# Patient Record
Sex: MALE | Race: WHITE | Employment: FULL TIME | URBAN - METROPOLITAN AREA
[De-identification: names, ages, dates, MRNs, and addresses within clinical notes are randomized per-mention and may not be internally consistent; named-entity substitution may affect disease eponyms.]

---

## 2018-08-24 ENCOUNTER — HOSPITAL ENCOUNTER (OUTPATIENT)
Dept: CV DIAGNOSTICS | Facility: HOSPITAL | Age: 43
Discharge: HOME | End: 2018-08-24
Attending: INTERNAL MEDICINE
Payer: COMMERCIAL

## 2018-08-24 ENCOUNTER — HOSPITAL ENCOUNTER (OUTPATIENT)
Dept: NUCLEAR MEDICINE | Facility: HOSPITAL | Age: 43
Discharge: HOME | End: 2018-08-24
Attending: INTERNAL MEDICINE
Payer: COMMERCIAL

## 2018-08-24 DIAGNOSIS — R07.9 CHEST PAIN, UNSPECIFIED: ICD-10-CM

## 2018-08-24 PROCEDURE — 78452 HT MUSCLE IMAGE SPECT MULT: CPT | Performed by: INTERNAL MEDICINE

## 2018-08-24 PROCEDURE — 93016 CV STRESS TEST SUPVJ ONLY: CPT

## 2018-08-24 PROCEDURE — 93018 CV STRESS TEST I&R ONLY: CPT

## 2018-08-24 PROCEDURE — 93017 CV STRESS TEST TRACING ONLY: CPT | Performed by: INTERNAL MEDICINE

## 2018-08-24 RX ORDER — SODIUM CHLORIDE 9 MG/ML
INJECTION, SOLUTION INTRAVENOUS
Status: COMPLETED
Start: 2018-08-24 | End: 2018-08-24

## 2018-08-24 RX ORDER — ALPRAZOLAM 0.25 MG/1
0.25 TABLET ORAL NIGHTLY PRN
COMMUNITY

## 2018-08-24 RX ADMIN — SODIUM CHLORIDE: 9 INJECTION, SOLUTION INTRAVENOUS at 11:44:00

## 2019-12-03 PROCEDURE — 88305 TISSUE EXAM BY PATHOLOGIST: CPT | Performed by: INTERNAL MEDICINE

## 2020-01-29 PROBLEM — Z86.010 HISTORY OF ADENOMATOUS POLYP OF COLON: Status: ACTIVE | Noted: 2020-01-29

## 2020-01-29 PROBLEM — Z86.0101 HISTORY OF ADENOMATOUS POLYP OF COLON: Status: ACTIVE | Noted: 2020-01-29

## 2020-04-22 ENCOUNTER — HOSPITAL ENCOUNTER (EMERGENCY)
Facility: HOSPITAL | Age: 45
Discharge: HOME | End: 2020-04-22
Attending: EMERGENCY MEDICINE
Payer: COMMERCIAL

## 2020-04-22 ENCOUNTER — APPOINTMENT (OUTPATIENT)
Dept: GENERAL RADIOLOGY | Facility: HOSPITAL | Age: 45
End: 2020-04-22
Attending: EMERGENCY MEDICINE
Payer: COMMERCIAL

## 2020-04-22 VITALS
OXYGEN SATURATION: 100 % | WEIGHT: 194 LBS | HEIGHT: 71 IN | TEMPERATURE: 99 F | RESPIRATION RATE: 18 BRPM | BODY MASS INDEX: 27.16 KG/M2 | SYSTOLIC BLOOD PRESSURE: 120 MMHG | DIASTOLIC BLOOD PRESSURE: 73 MMHG | HEART RATE: 79 BPM

## 2020-04-22 DIAGNOSIS — J45.21 MILD INTERMITTENT ASTHMA WITH EXACERBATION: ICD-10-CM

## 2020-04-22 DIAGNOSIS — U07.1 COVID-19: Primary | ICD-10-CM

## 2020-04-22 PROCEDURE — 82962 GLUCOSE BLOOD TEST: CPT

## 2020-04-22 PROCEDURE — 99284 EMERGENCY DEPT VISIT MOD MDM: CPT

## 2020-04-22 PROCEDURE — 99453 REM MNTR PHYSIOL PARAM SETUP: CPT

## 2020-04-22 PROCEDURE — 71045 X-RAY EXAM CHEST 1 VIEW: CPT | Performed by: EMERGENCY MEDICINE

## 2020-04-22 PROCEDURE — 81003 URINALYSIS AUTO W/O SCOPE: CPT | Performed by: EMERGENCY MEDICINE

## 2020-04-22 RX ORDER — DEXAMETHASONE SODIUM PHOSPHATE 4 MG/ML
10 INJECTION, SOLUTION INTRA-ARTICULAR; INTRALESIONAL; INTRAMUSCULAR; INTRAVENOUS; SOFT TISSUE ONCE
Status: COMPLETED | OUTPATIENT
Start: 2020-04-22 | End: 2020-04-22

## 2020-04-23 NOTE — ED PROVIDER NOTES
Patient Seen in: [facility] Emergency Department    History   Patient presents with:  Fever    Stated Complaint: Fever w/mult positive contacts d/t field of work     HPI    66-year-old male with past medical history of asthma presenting for evaluati swelling and arthralgias. Positive for stated complaint: Fever w/mult positive contacts d/t field of work  Other systems are as noted in HPI. Constitutional and vital signs reviewed. All other systems reviewed and negative except as noted above. significant pulmonary parenchymal abnormalities. No effusion or pleural thickening. BONES: No fracture or visible bony lesion. OTHER: Negative. CONCLUSION: No acute cardiopulmonary abnormality.    Dictated by (CST): Moncho Ashraf MD on 4/22/2020 at 10:44 PM

## 2020-04-23 NOTE — CM/SW NOTE
Called by Dr. Shawna Johnson to provide patient with IS and pulse oximetry as patient is COVID + and is an asthmatic. Patient instructions included:  1. Orientation to the device, purpose, and return demo given  2.  Instructions reviewed to check with pulse ox d

## 2020-04-23 NOTE — ED NOTES
Pt presents to ED for fevers and congestion. Pt states he has had +covid exposures. Pt denies sob or chest pain.

## 2020-04-23 NOTE — ED NOTES
Discharge instructions reviewed with pt. Pt denies any further questions at this time. Pt understands to return to ED for any sob, low O2 saturations of 90% or lower, or trouble breathing.

## 2020-12-23 PROBLEM — F41.9 ANXIETY: Status: ACTIVE | Noted: 2018-02-20

## 2020-12-23 PROBLEM — F90.2 ATTENTION DEFICIT HYPERACTIVITY DISORDER (ADHD), COMBINED TYPE: Status: ACTIVE | Noted: 2020-12-23

## 2020-12-23 PROBLEM — E78.5 DYSLIPIDEMIA: Status: ACTIVE | Noted: 2017-12-07

## 2020-12-23 PROBLEM — R41.840 COGNITIVE ATTENTION DEFICIT: Status: ACTIVE | Noted: 2020-12-03

## 2021-09-03 PROBLEM — J30.2 SEASONAL ALLERGIC RHINITIS: Status: ACTIVE | Noted: 2021-07-20

## 2023-09-08 ENCOUNTER — LAB REQUISITION (OUTPATIENT)
Dept: LAB | Facility: HOSPITAL | Age: 48
End: 2023-09-08
Payer: COMMERCIAL

## 2023-09-08 DIAGNOSIS — R97.20 ELEVATED PROSTATE SPECIFIC ANTIGEN (PSA): ICD-10-CM

## 2023-09-08 PROCEDURE — 88305 TISSUE EXAM BY PATHOLOGIST: CPT | Performed by: UROLOGY

## 2023-09-19 ENCOUNTER — HOSPITAL ENCOUNTER (OUTPATIENT)
Dept: CT IMAGING | Facility: HOSPITAL | Age: 48
Discharge: HOME | End: 2023-09-19
Attending: UROLOGY
Payer: COMMERCIAL

## 2023-09-19 DIAGNOSIS — C61 PROSTATE CANCER (HCC): ICD-10-CM

## 2023-09-19 LAB
CREAT BLD-MCNC: 0.9 MG/DL
EGFRCR SERPLBLD CKD-EPI 2021: 104 ML/MIN/1.73M2 (ref 60–?)

## 2023-09-19 PROCEDURE — 74177 CT ABD & PELVIS W/CONTRAST: CPT | Performed by: UROLOGY

## 2023-09-19 PROCEDURE — 82565 ASSAY OF CREATININE: CPT

## 2023-09-22 ENCOUNTER — TELEPHONE (OUTPATIENT)
Dept: RADIATION ONCOLOGY | Facility: HOSPITAL | Age: 48
End: 2023-09-22

## 2023-09-22 NOTE — TELEPHONE ENCOUNTER
Appointment Schedule Change  Davy Vail.  1975  Ph:[phone]  Dr Craig Alvarez appointment on 10/5/2023  3:00   Rad on RN @2:30  Was told that their was a 9:00 slot open on this day. would like to know if this is still open to change schedule.    Thanks [employer]

## 2023-10-05 ENCOUNTER — APPOINTMENT (OUTPATIENT)
Dept: RADIATION ONCOLOGY | Facility: HOSPITAL | Age: 48
End: 2023-10-05
Attending: RADIOLOGY
Payer: COMMERCIAL

## 2023-10-09 ENCOUNTER — OFFICE VISIT (OUTPATIENT)
Dept: RADIATION ONCOLOGY | Facility: HOSPITAL | Age: 48
End: 2023-10-09
Attending: RADIOLOGY
Payer: COMMERCIAL

## 2023-10-09 VITALS
TEMPERATURE: 98 F | BODY MASS INDEX: 26 KG/M2 | WEIGHT: 183 LBS | OXYGEN SATURATION: 98 % | SYSTOLIC BLOOD PRESSURE: 130 MMHG | RESPIRATION RATE: 18 BRPM | HEART RATE: 77 BPM | DIASTOLIC BLOOD PRESSURE: 69 MMHG

## 2023-10-09 PROCEDURE — 99212 OFFICE O/P EST SF 10 MIN: CPT

## 2023-10-09 NOTE — PATIENT INSTRUCTIONS
Call [phone] to reach a radiation nurse. Please call with any radiation questions or once a treatment decision has been made.

## 2023-11-20 ENCOUNTER — TELEPHONE (OUTPATIENT)
Dept: RADIATION ONCOLOGY | Facility: HOSPITAL | Age: 48
End: 2023-11-20

## 2023-11-20 NOTE — TELEPHONE ENCOUNTER
Called pt to ask if treatment decision has been made. Pt decided to go forward with surgery, planned next week at [facility]. MD notified.

## 2024-01-30 ENCOUNTER — ORDER TRANSCRIPTION (OUTPATIENT)
Dept: PHYSICAL THERAPY | Facility: HOSPITAL | Age: 49
End: 2024-01-30

## 2024-01-30 DIAGNOSIS — R32 INCONTINENCE: Primary | ICD-10-CM

## 2024-02-13 ENCOUNTER — TELEPHONE (OUTPATIENT)
Dept: PHYSICAL THERAPY | Facility: HOSPITAL | Age: 49
End: 2024-02-13

## 2024-02-14 NOTE — PROGRESS NOTES
PELVIC FLOOR EVALUATION:   Referring Physician: Dr. Pineda  Diagnosis: Incontinence (R32)       Date of onset: 11/28/23 Date of Service: 2/15/2024   Precautions:  Cancer    PATIENT SUMMARY   Victoriano Bhatti is a 48 year old male  who presents to therapy today with complaints of reports that he still has issues with leaking when he drinks alcohol /caffeine. Pt states that he has leaking when he passes gas. He has some variations with urine flow and pubic pain when holding off voiding.Holding off voiding can cause sharp pain, pain is on and off    Current symptoms include: post void dribble and stress incontinence, urine flow, could be frequent ,pubic pain   Current functional limitations/compensations with ADLS/lifestyle : states that he has been wearing pads for precaution, outside the house, especially when he goes out  Pt describes pain level: current 0/10, best 0/10, worst 8/10.   Quality: intermittent and sharp    Prostate History/History of condition:pt states that he has been diagnosed with prostate cancer sept 11 and surgical removal done November the same year, no radiation done and no formal PT    Surgical history:prostatectomy  Urodynamic Test: none  Occupation/Activities: retired policemen, private /driving    Victoriano describes prior level of function pt states that he has was fine,  noticed some dribbling prior. Pt states that he does jujitsu and weight training as an exercise    Pt goals include to not have pads anymore       Past medical history was reviewed with Victoriano. Significant findings include   Past Medical History:   Diagnosis Date    Hyperlipidemia     Lipid screening 06/01/2009    per NG    Prostate cancer (HCC)     Recurrent corneal erosion 2008        Safety Questions:   Are you being hurt, frightened, demeaned, or taken advantage of by anyone at your home or in your life?  No      Have you recently had thoughts of hurting yourself?  No    Have you tried to hurt yourself in the  past?  No       URINARY HABIT  Types of symptoms: stress incontinence, incomplete emptying, and nocturia,pubic pain   Events associated with the onset of urinary complaints: passes gas  Urinary Frequency: could be increased  Urine Stream: varies  Sit/stand for urination:stand  Amount: good  Leaking occurs:   Amount of leakage: small amount  Pad use: as needed  Pad Change frequency: 1  Nocturia: 1x  Fluid Intake: water  Bladder irritants: coffee 1-2 x a day  Urine Stop test: yes  Post void dribble: yes  Empty bladder just in case: yes  Do you ever leak urine without knowing it? No  underwear 1-2 x a day  BOWEL HABITS  Types of symptoms: other      Frequency of bowel movements: 1x aday  Stool consistency: [place] Stool Scale: 3 and 4  Do you strain with defecation: No   Laxative/Stool softener use: No    SEXUAL HEALTH STATUS  Sexual [place] Status: NA  Pain:none  Erection status: on medications, better    ASSESSMENT  Victoriano presents to physical therapy evaluation with primary c/o urgency/frequency, pressure, post void dribble, and stress incontinence , nocturia 1x , after  prostatectomy in November. The results of the objective tests and measures show issues with   motor function, muscle performance, muscle endurance, and coordination involving the PFM with  noted decreased strength, endurance and coordination.  Functional deficits include but are not limited to wearing pads for precaution, outside the house, especially when he goes out.  . Pt and PT discussed evaluation findings, pathology, POC and HEP.  Pt voiced understanding and performs HEP correctly without reported pain. Skilled Pelvic Physical Therapy is medically necessary to address the above impairments and reach functional goals.    OBJECTIVE:   Posture:  Gait:Gait:  pt ambulates on level ground with normal mechanics  Stairs:NT     Range Of Motion  Lumbar AROM screen: WFL in all planes , painfree    Palpation:     Adductors :WNL   Greater  trochanter/Gluteal area :WNL    Piriformis :WNL   Paravertebrals: WNL   LE AROM screen: grossly WNL     Strength (MMT) 5/5 JOHN LE except 5    Transverse Abdominis: NT/5    Flexibility Summary: WNL JOHN LE except min loss on B hamstring  Functional screen:  Double leg squat:WFL  Single leg squat:WFL  Single leg stance: 30 secs    External Pelvic Floor Muscle strength: marked contraction with 5 sec hold  Accessory Muscle Use: adductors    ICIQ-UI Short form:to be determined  Today's Treatment and Response:   Patient education was provided on objective findings expectations with treatment outcomes. Educated on pelvic anatomy and function with diagrams and pelvic model, bladder normatives, adequate hydration levels, and coordination of diaphragmatic breathing and pelvic floor contraction   Diaphragmatic breathing x3  2. Diaphragmatic breathing with PF coordination x3  3. PFM contraction s 5 secs x5 supine  4. PFM quick contraction 1-2 secs x 10 reps  Patient was instructed in and issued a HEP for: 1-4 plus bladder diary    Charges: PT Eval Low Complexity, neuro tierra x 1 ( 10 mins)      Total Timed Treatment: 10 min     Total Treatment Time: 50 min     Based on clinical rationale and outcome measures, this evaluation involved Low Complexity decision making due to 1-2 personal factors/comorbidities, 3 body structures involved/activity limitations, and evolving symptoms including stress urinary incontinence,urine faustino variations and pubic pain  PLAN OF CARE:    Goals: (to be met in 8 visits)    Patient to report urinary frequency to every 2-4 hours to allow for independent ADLs  Patient exhibits an increase in pelvic floor strength with ability to contract and relax and hold secs hold to 10 counts hold with good contraction to allow for pelvic brace with ADLs.    Patient reports a reduction in CAROLEE by 75% or better resulting in decrease pad use from 1 as needed to 0 consistently,decreased underwear change to 1x a day    Pt  will demonstrate biomechanical strategies for managing IAP for functional transfers, floor to waist lifting to  safely perform daily and work tasks with no leakage 75% of the time for safe return to PLOF    Pt will report decreased nocturia incidents  to 0-1 x a night to promote better sleeping pattern    Frequency / Duration: Patient will be seen for 1 x/week or a total of 8 visits over a 90 day period.  Treatment will include: Manual Therapy, Mechanical Traction, Neuromuscular Re-education, Self-Care Home Management, Therapeutic Activities, Therapeutic Exercise, and Home Exercise Program instruction     Education or treatment limitation: None  Rehab Potential:good      Patient/Family/Caregiver was advised of these findings, precautions, and treatment options and has agreed to actively participate in planning and for this course of care.    Thank you for your referral. Please co-sign or sign and return this letter via fax as soon as possible to [phone]. If you have any questions, please contact me at Dept: [phone]    Sincerely,  Electronically signed by therapist: Tabitha Ulloa, PT  Physician's certification required: Yes  I certify the need for these services furnished under this plan of treatment and while under my care.    X___________________________________________________ Date____________________    Certification From: 2/15/2024  To:5/15/2024

## 2024-02-15 ENCOUNTER — OFFICE VISIT (OUTPATIENT)
Dept: PHYSICAL THERAPY | Age: 49
End: 2024-02-15
Attending: UROLOGY
Payer: COMMERCIAL

## 2024-02-15 DIAGNOSIS — R32 INCONTINENCE: Primary | ICD-10-CM

## 2024-02-15 PROCEDURE — 97161 PT EVAL LOW COMPLEX 20 MIN: CPT

## 2024-02-15 PROCEDURE — 97112 NEUROMUSCULAR REEDUCATION: CPT

## 2024-02-20 NOTE — PROGRESS NOTES
Dx:  Incontinence (R32)             Insurance   PPO           Authorized  # visits by insurance  :  8    Expiration date  of Authorization:5/15/24    Eval date/latest PN:2/15/24  Initial POC# of visits: 8                POC cert : 5/15/24    Authorizing Physician: Dr. Pineda    Fall Risk: standard         Precautions: cancer         Subjective: ***  PAIN LEVEL:***/10  Assessment: ***      Objective: ***        Goals:   goals addressed this day as noted above  Patient to report urinary frequency to every 2-4 hours to allow for independent ADLs  Patient exhibits an increase in pelvic floor strength with ability to contract and relax and hold secs hold to 10 counts hold with good contraction to allow for pelvic brace with ADLs.     Patient reports a reduction in CAROLEE by 75% or better resulting in decrease pad use from 1 as needed to 0 consistently,decreased underwear change to 1x a day     Pt will demonstrate biomechanical strategies for managing IAP for functional transfers, floor to waist lifting to  safely perform daily and work tasks with no leakage 75% of the time for safe return to PLOF     Pt will report decreased nocturia incidents  to 0-1 x a night to promote better sleeping pattern     Plan: ***     Date:  TX#: 2/*** Date:              TX#: 3/ Date:        TX#: 4/ Date:               TX#: 5/   Date:   Tx#: 6/   Theraex:        Manual:        Gait/NMRed:        Modalities        HEP see patient instructions tab if with new HEP see patient instructions tab if with new HEP see patient instructions tab if with new HEP see patient instructions tab if with new HEP see patient instructions tab if with new HEP          Charges: ***       Total Timed Treatment: *** min  Total Treatment Time: *** min

## 2024-02-21 ENCOUNTER — TELEPHONE (OUTPATIENT)
Dept: PHYSICAL THERAPY | Facility: HOSPITAL | Age: 49
End: 2024-02-21

## 2024-02-21 ENCOUNTER — APPOINTMENT (OUTPATIENT)
Dept: PHYSICAL THERAPY | Age: 49
End: 2024-02-21
Attending: UROLOGY
Payer: COMMERCIAL

## 2024-02-27 ENCOUNTER — TELEPHONE (OUTPATIENT)
Dept: PHYSICAL THERAPY | Facility: HOSPITAL | Age: 49
End: 2024-02-27

## 2024-02-28 ENCOUNTER — APPOINTMENT (OUTPATIENT)
Dept: PHYSICAL THERAPY | Age: 49
End: 2024-02-28
Attending: UROLOGY
Payer: COMMERCIAL

## 2024-03-06 ENCOUNTER — APPOINTMENT (OUTPATIENT)
Dept: PHYSICAL THERAPY | Age: 49
End: 2024-03-06
Attending: UROLOGY
Payer: COMMERCIAL

## 2024-03-13 ENCOUNTER — APPOINTMENT (OUTPATIENT)
Dept: PHYSICAL THERAPY | Age: 49
End: 2024-03-13
Attending: UROLOGY
Payer: COMMERCIAL

## 2024-03-20 ENCOUNTER — APPOINTMENT (OUTPATIENT)
Dept: PHYSICAL THERAPY | Age: 49
End: 2024-03-20
Attending: UROLOGY
Payer: COMMERCIAL

## 2024-03-27 ENCOUNTER — APPOINTMENT (OUTPATIENT)
Dept: PHYSICAL THERAPY | Age: 49
End: 2024-03-27
Attending: UROLOGY
Payer: COMMERCIAL

## 2024-04-03 ENCOUNTER — APPOINTMENT (OUTPATIENT)
Dept: PHYSICAL THERAPY | Age: 49
End: 2024-04-03
Attending: UROLOGY
Payer: COMMERCIAL

## 2024-04-10 ENCOUNTER — APPOINTMENT (OUTPATIENT)
Dept: PHYSICAL THERAPY | Age: 49
End: 2024-04-10
Attending: UROLOGY
Payer: COMMERCIAL

## 2024-04-17 ENCOUNTER — APPOINTMENT (OUTPATIENT)
Dept: PHYSICAL THERAPY | Age: 49
End: 2024-04-17
Attending: UROLOGY
Payer: COMMERCIAL

## 2024-04-24 ENCOUNTER — APPOINTMENT (OUTPATIENT)
Dept: PHYSICAL THERAPY | Age: 49
End: 2024-04-24
Attending: UROLOGY
Payer: COMMERCIAL

## 2024-06-03 ENCOUNTER — APPOINTMENT (OUTPATIENT)
Dept: URBAN - METROPOLITAN AREA CLINIC 244 | Age: 49
Setting detail: DERMATOLOGY
End: 2024-06-04

## 2024-06-03 DIAGNOSIS — A63.0 ANOGENITAL (VENEREAL) WARTS: ICD-10-CM

## 2024-06-03 PROCEDURE — OTHER COUNSELING: OTHER

## 2024-06-03 PROCEDURE — 54056 CRYOSURGERY PENIS LESION(S): CPT

## 2024-06-03 PROCEDURE — OTHER OTC TREATMENT REGIMEN: OTHER

## 2024-06-03 PROCEDURE — 99203 OFFICE O/P NEW LOW 30 MIN: CPT | Mod: 25

## 2024-06-03 PROCEDURE — OTHER SEPARATE AND IDENTIFIABLE DOCUMENTATION: OTHER

## 2024-06-03 PROCEDURE — OTHER LIQUID NITROGEN GENITALS MULTI: OTHER

## 2024-06-03 ASSESSMENT — LOCATION ZONE DERM: LOCATION ZONE: PENIS

## 2024-06-03 ASSESSMENT — LOCATION SIMPLE DESCRIPTION DERM: LOCATION SIMPLE: PENIS

## 2024-06-03 ASSESSMENT — LOCATION DETAILED DESCRIPTION DERM: LOCATION DETAILED: RIGHT DORSAL SHAFT OF PENIS

## 2024-06-03 NOTE — HPI: SKIN LESION
How Severe Is Your Skin Lesion?: mild
Is This A New Presentation, Or A Follow-Up?: Skin Lesions
Additional History: Lesion was removed years ago with excision but lesion recurred shortly after, has been present for years

## 2024-06-03 NOTE — PROCEDURE: LIQUID NITROGEN GENITALS MULTI
Post-Care Instructions: I reviewed with the patient in detail post-care instructions. Patient is to wear sunprotection, and avoid picking at any of the treated lesions. Pt may apply Vaseline to crusted or scabbing areas.
Render Post-Care Instructions In Note?: no
Total Number Of Lesions Treated: 2
Detail Level: Detailed
Consent: The patient's consent was obtained including but not limited to risks of crusting, scabbing, blistering, scarring, darker or lighter pigmentary change, recurrence, incomplete removal and infection.

## 2024-06-03 NOTE — PROCEDURE: OTC TREATMENT REGIMEN
Patient Specific Otc Recommendations (Will Not Stick From Patient To Patient): Can do vaseline after tx to area
Detail Level: Zone

## 2024-07-08 ENCOUNTER — APPOINTMENT (OUTPATIENT)
Dept: URBAN - METROPOLITAN AREA CLINIC 244 | Age: 49
Setting detail: DERMATOLOGY
End: 2024-07-08

## 2024-07-08 DIAGNOSIS — A63.0 ANOGENITAL (VENEREAL) WARTS: ICD-10-CM

## 2024-07-08 PROCEDURE — OTHER LIQUID NITROGEN GENITALS MULTI: OTHER

## 2024-07-08 PROCEDURE — OTHER OTC TREATMENT REGIMEN: OTHER

## 2024-07-08 PROCEDURE — 54056 CRYOSURGERY PENIS LESION(S): CPT

## 2024-07-08 ASSESSMENT — LOCATION ZONE DERM: LOCATION ZONE: PENIS

## 2024-07-08 ASSESSMENT — LOCATION DETAILED DESCRIPTION DERM: LOCATION DETAILED: RIGHT DORSAL SHAFT OF PENIS

## 2024-07-08 ASSESSMENT — LOCATION SIMPLE DESCRIPTION DERM: LOCATION SIMPLE: PENIS

## 2025-04-14 ENCOUNTER — APPOINTMENT (OUTPATIENT)
Dept: URBAN - METROPOLITAN AREA CLINIC 244 | Age: 50
Setting detail: DERMATOLOGY
End: 2025-04-15

## 2025-04-14 DIAGNOSIS — D49.2 NEOPLASM OF UNSPECIFIED BEHAVIOR OF BONE, SOFT TISSUE, AND SKIN: ICD-10-CM

## 2025-04-14 PROCEDURE — OTHER DEFER: OTHER

## 2025-04-14 PROCEDURE — 99212 OFFICE O/P EST SF 10 MIN: CPT

## 2025-04-14 PROCEDURE — OTHER COUNSELING: OTHER

## 2025-04-14 PROCEDURE — OTHER ADDITIONAL NOTES: OTHER

## 2025-04-14 ASSESSMENT — LOCATION ZONE DERM: LOCATION ZONE: FACE

## 2025-04-14 ASSESSMENT — LOCATION DETAILED DESCRIPTION DERM: LOCATION DETAILED: LEFT SUPERIOR CENTRAL MALAR CHEEK

## 2025-04-14 ASSESSMENT — LOCATION SIMPLE DESCRIPTION DERM: LOCATION SIMPLE: LEFT CHEEK

## 2025-04-14 NOTE — PROCEDURE: ADDITIONAL NOTES
Render Risk Assessment In Note?: no
Detail Level: Simple
Additional Notes: No features of cancer, pt advised to take care of area, use Vaseline daily and come back in within 4 weeks for follow up\\nWife noticed spot one week ago and pt does mely adams and is not sure if he got a scrape.

## 2025-05-14 ENCOUNTER — APPOINTMENT (OUTPATIENT)
Dept: URBAN - METROPOLITAN AREA CLINIC 244 | Age: 50
Setting detail: DERMATOLOGY
End: 2025-05-15

## 2025-05-14 DIAGNOSIS — D49.2 NEOPLASM OF UNSPECIFIED BEHAVIOR OF BONE, SOFT TISSUE, AND SKIN: ICD-10-CM

## 2025-05-14 PROCEDURE — OTHER BIOPSY BY SHAVE METHOD: OTHER

## 2025-05-14 PROCEDURE — 11102 TANGNTL BX SKIN SINGLE LES: CPT

## 2025-05-14 PROCEDURE — OTHER COUNSELING: OTHER

## 2025-05-14 ASSESSMENT — LOCATION SIMPLE DESCRIPTION DERM: LOCATION SIMPLE: LEFT CHEEK

## 2025-05-14 ASSESSMENT — LOCATION DETAILED DESCRIPTION DERM
LOCATION DETAILED: LEFT SUPERIOR LATERAL MALAR CHEEK
LOCATION DETAILED: LEFT SUPERIOR CENTRAL MALAR CHEEK

## 2025-05-14 ASSESSMENT — LOCATION ZONE DERM: LOCATION ZONE: FACE

## 2025-06-16 ENCOUNTER — APPOINTMENT (OUTPATIENT)
Dept: URBAN - METROPOLITAN AREA CLINIC 244 | Age: 50
Setting detail: DERMATOLOGY
End: 2025-06-16

## 2025-06-16 DIAGNOSIS — L82.1 OTHER SEBORRHEIC KERATOSIS: ICD-10-CM

## 2025-06-16 DIAGNOSIS — D22 MELANOCYTIC NEVI: ICD-10-CM

## 2025-06-16 DIAGNOSIS — L81.4 OTHER MELANIN HYPERPIGMENTATION: ICD-10-CM

## 2025-06-16 DIAGNOSIS — L57.0 ACTINIC KERATOSIS: ICD-10-CM

## 2025-06-16 DIAGNOSIS — D18.0 HEMANGIOMA: ICD-10-CM

## 2025-06-16 DIAGNOSIS — Z12.83 ENCOUNTER FOR SCREENING FOR MALIGNANT NEOPLASM OF SKIN: ICD-10-CM

## 2025-06-16 PROBLEM — D22.9 MELANOCYTIC NEVI, UNSPECIFIED: Status: ACTIVE | Noted: 2025-06-16

## 2025-06-16 PROBLEM — D18.01 HEMANGIOMA OF SKIN AND SUBCUTANEOUS TISSUE: Status: ACTIVE | Noted: 2025-06-16

## 2025-06-16 PROCEDURE — OTHER MIPS QUALITY: OTHER

## 2025-06-16 PROCEDURE — OTHER ADDITIONAL NOTES: OTHER

## 2025-06-16 PROCEDURE — OTHER LIQUID NITROGEN: OTHER

## 2025-06-16 PROCEDURE — 17000 DESTRUCT PREMALG LESION: CPT

## 2025-06-16 PROCEDURE — OTHER COUNSELING: OTHER

## 2025-06-16 PROCEDURE — 99213 OFFICE O/P EST LOW 20 MIN: CPT | Mod: 25

## 2025-06-16 ASSESSMENT — LOCATION SIMPLE DESCRIPTION DERM
LOCATION SIMPLE: ABDOMEN
LOCATION SIMPLE: LEFT ZYGOMA

## 2025-06-16 ASSESSMENT — LOCATION DETAILED DESCRIPTION DERM
LOCATION DETAILED: EPIGASTRIC SKIN
LOCATION DETAILED: LEFT MEDIAL ZYGOMA

## 2025-06-16 ASSESSMENT — LOCATION ZONE DERM
LOCATION ZONE: FACE
LOCATION ZONE: TRUNK

## 2025-08-08 ENCOUNTER — OFFICE VISIT (OUTPATIENT)
Dept: OTOLARYNGOLOGY | Facility: CLINIC | Age: 50
End: 2025-08-08

## 2025-08-08 VITALS — WEIGHT: 185 LBS | BODY MASS INDEX: 25.9 KG/M2 | HEIGHT: 71 IN

## 2025-08-08 DIAGNOSIS — J33.9 NASAL POLYPOSIS: Primary | ICD-10-CM

## 2025-08-08 PROCEDURE — 99203 OFFICE O/P NEW LOW 30 MIN: CPT | Performed by: OTOLARYNGOLOGY

## 2025-08-08 PROCEDURE — 3008F BODY MASS INDEX DOCD: CPT | Performed by: OTOLARYNGOLOGY

## 2025-08-08 RX ORDER — PREDNISONE 10 MG/1
TABLET ORAL
Qty: 44 TABLET | Refills: 0 | Status: SHIPPED | OUTPATIENT
Start: 2025-08-08

## 2025-08-08 RX ORDER — TADALAFIL 5 MG/1
1 TABLET ORAL AS NEEDED
COMMUNITY
Start: 2025-01-06

## 2025-08-08 RX ORDER — MONTELUKAST SODIUM 10 MG/1
10 TABLET ORAL NIGHTLY
Qty: 30 TABLET | Refills: 3 | Status: SHIPPED | OUTPATIENT
Start: 2025-08-08

## 2025-08-08 RX ORDER — ACETAMINOPHEN 325 MG/1
2 TABLET ORAL EVERY 6 HOURS PRN
COMMUNITY
Start: 2023-11-29

## 2025-08-08 RX ORDER — TESTOSTERONE CYPIONATE 200 MG/ML
100 INJECTION, SOLUTION INTRAMUSCULAR WEEKLY
COMMUNITY

## 2025-08-08 RX ORDER — TESTOSTERONE 20.25 MG/1.25G
GEL TOPICAL
COMMUNITY
Start: 2025-01-09

## 2025-08-08 RX ORDER — CETIRIZINE HYDROCHLORIDE 10 MG/1
10 TABLET ORAL
COMMUNITY

## 2025-08-13 ENCOUNTER — HOSPITAL ENCOUNTER (OUTPATIENT)
Dept: CT IMAGING | Facility: HOSPITAL | Age: 50
Discharge: HOME | End: 2025-08-13
Attending: OTOLARYNGOLOGY

## 2025-08-13 ENCOUNTER — TELEPHONE (OUTPATIENT)
Dept: OTOLARYNGOLOGY | Facility: CLINIC | Age: 50
End: 2025-08-13

## 2025-08-13 DIAGNOSIS — J33.9 NASAL POLYPOSIS: ICD-10-CM

## 2025-08-13 PROCEDURE — 70486 CT MAXILLOFACIAL W/O DYE: CPT | Performed by: OTOLARYNGOLOGY

## 2025-08-19 ENCOUNTER — OFFICE VISIT (OUTPATIENT)
Dept: OTOLARYNGOLOGY | Facility: CLINIC | Age: 50
End: 2025-08-19

## 2025-08-19 DIAGNOSIS — J33.9 NASAL POLYPOSIS: Primary | ICD-10-CM

## 2025-08-19 PROCEDURE — 99213 OFFICE O/P EST LOW 20 MIN: CPT | Performed by: OTOLARYNGOLOGY

## (undated) NOTE — LETTER
Patient Name: Victoriano Bhatti  YOB: 1975          MRN number:  [id number]  Date:  2/15/2024  Referring Physician:  Yamil Pineda         PELVIC FLOOR EVALUATION:   Referring Physician: Dr. Pineda  Diagnosis: Incontinence (R32)       Date of onset: 11/28/23 Date of Service: 2/15/2024   Precautions:  Cancer    PATIENT SUMMARY   Victoriano Bhatti is a 48 year old male  who presents to therapy today with complaints of reports that he still has issues with leaking when he drinks alcohol /caffeine. Pt states that he has leaking when he passes gas. He has some variations with urine flow and pubic pain when holding off voiding.Holding off voiding can cause sharp pain, pain is on and off    Current symptoms include: post void dribble and stress incontinence, urine flow, could be frequent ,pubic pain   Current functional limitations/compensations with ADLS/lifestyle : states that he has been wearing pads for precaution, outside the house, especially when he goes out  Pt describes pain level: current 0/10, best 0/10, worst 8/10.   Quality: intermittent and sharp    Prostate History/History of condition:pt states that he has been diagnosed with prostate cancer sept 11 and surgical removal done November the same year, no radiation done and no formal PT    Surgical history:prostatectomy  Urodynamic Test: none  Occupation/Activities: retired policemen, private /driving    Victoriano describes prior level of function pt states that he has was fine,  noticed some dribbling prior. Pt states that he does jujitsu and weight training as an exercise    Pt goals include to not have pads anymore       Past medical history was reviewed with Victoriano. Significant findings include   Past Medical History:   Diagnosis Date    Hyperlipidemia     Lipid screening 06/01/2009    per NG    Prostate cancer (HCC)     Recurrent corneal erosion 2008        Safety Questions:   Are you being hurt, frightened, demeaned, or taken  advantage of by anyone at your home or in your life?  No      Have you recently had thoughts of hurting yourself?  No    Have you tried to hurt yourself in the past?  No       URINARY HABIT  Types of symptoms: stress incontinence, incomplete emptying, and nocturia,pubic pain   Events associated with the onset of urinary complaints: passes gas  Urinary Frequency: could be increased  Urine Stream: varies  Sit/stand for urination:stand  Amount: good  Leaking occurs:   Amount of leakage: small amount  Pad use: as needed  Pad Change frequency: 1  Nocturia: 1x  Fluid Intake: water  Bladder irritants: coffee 1-2 x a day  Urine Stop test: yes  Post void dribble: yes  Empty bladder just in case: yes  Do you ever leak urine without knowing it? No  underwear 1-2 x a day  BOWEL HABITS  Types of symptoms: other      Frequency of bowel movements: 1x aday  Stool consistency: [place] Stool Scale: 3 and 4  Do you strain with defecation: No   Laxative/Stool softener use: No    SEXUAL HEALTH STATUS  Sexual [place] Status: NA  Pain:none  Erection status: on medications, better    ASSESSMENT  Victoriano presents to physical therapy evaluation with primary c/o urgency/frequency, pressure, post void dribble, and stress incontinence , nocturia 1x , after  prostatectomy in November. The results of the objective tests and measures show issues with   motor function, muscle performance, muscle endurance, and coordination involving the PFM with  noted decreased strength, endurance and coordination.  Functional deficits include but are not limited to wearing pads for precaution, outside the house, especially when he goes out.  . Pt and PT discussed evaluation findings, pathology, POC and HEP.  Pt voiced understanding and performs HEP correctly without reported pain. Skilled Pelvic Physical Therapy is medically necessary to address the above impairments and reach functional goals.    OBJECTIVE:   Posture:  Gait:Gait:  pt ambulates on level ground  with normal mechanics  Stairs:NT     Range Of Motion  Lumbar AROM screen: WFL in all planes , painfree    Palpation:     Adductors :WNL   Greater trochanter/Gluteal area :WNL    Piriformis :WNL   Paravertebrals: WNL   LE AROM screen: grossly WNL     Strength (MMT) 5/5 JOHN LE except 5    Transverse Abdominis: NT/5    Flexibility Summary: WNL JOHN LE except min loss on B hamstring  Functional screen:  Double leg squat:WFL  Single leg squat:WFL  Single leg stance: 30 secs    External Pelvic Floor Muscle strength: marked contraction with 5 sec hold  Accessory Muscle Use: adductors    ICIQ-UI Short form:to be determined  Today's Treatment and Response:   Patient education was provided on objective findings expectations with treatment outcomes. Educated on pelvic anatomy and function with diagrams and pelvic model, bladder normatives, adequate hydration levels, and coordination of diaphragmatic breathing and pelvic floor contraction   Diaphragmatic breathing x3  2. Diaphragmatic breathing with PF coordination x3  3. PFM contraction s 5 secs x5 supine  4. PFM quick contraction 1-2 secs x 10 reps  Patient was instructed in and issued a HEP for: 1-4 plus bladder diary    Charges: PT Eval Low Complexity, neuro tierra x 1 ( 10 mins)      Total Timed Treatment: 10 min     Total Treatment Time: 50 min     Based on clinical rationale and outcome measures, this evaluation involved Low Complexity decision making due to 1-2 personal factors/comorbidities, 3 body structures involved/activity limitations, and evolving symptoms including stress urinary incontinence,urine faustino variations and pubic pain  PLAN OF CARE:    Goals: (to be met in 8 visits)    Patient to report urinary frequency to every 2-4 hours to allow for independent ADLs  Patient exhibits an increase in pelvic floor strength with ability to contract and relax and hold secs hold to 10 counts hold with good contraction to allow for pelvic brace with ADLs.    Patient reports a  reduction in CAROLEE by 75% or better resulting in decrease pad use from 1 as needed to 0 consistently,decreased underwear change to 1x a day    Pt will demonstrate biomechanical strategies for managing IAP for functional transfers, floor to waist lifting to  safely perform daily and work tasks with no leakage 75% of the time for safe return to PLOF    Pt will report decreased nocturia incidents  to 0-1 x a night to promote better sleeping pattern    Frequency / Duration: Patient will be seen for 1 x/week or a total of 8 visits over a 90 day period.  Treatment will include: Manual Therapy, Mechanical Traction, Neuromuscular Re-education, Self-Care Home Management, Therapeutic Activities, Therapeutic Exercise, and Home Exercise Program instruction     Education or treatment limitation: None  Rehab Potential:good      Patient/Family/Caregiver was advised of these findings, precautions, and treatment options and has agreed to actively participate in planning and for this course of care.    Thank you for your referral. Please co-sign or sign and return this letter via fax as soon as possible to [phone]. If you have any questions, please contact me at Dept: [phone]    Sincerely,  Electronically signed by therapist: Tabitha Ulloa PT  Physician's certification required: Yes  I certify the need for these services furnished under this plan of treatment and while under my care.    X___________________________________________________ Date____________________    Certification From: 2/15/2024  To:5/15/2024      21st Century Cures Act Notice to Patient: Medical documents like this are made available to patients in the interest of transparency. However, be advised this is a medical document and it is intended as [id number] communication between your medical providers. This medical document may contain abbreviations, assessments, medical data, and results or other terms that are unfamiliar. Medical documents are intended to  carry relevant information, facts as evident, and the clinical opinion of the practitioner. As such, this medical document may be written in language that appears blunt or direct. You are encouraged to contact your medical provider and/or [facility] if you have any questions about this medical document.